# Patient Record
Sex: FEMALE | Race: WHITE | Employment: FULL TIME | URBAN - METROPOLITAN AREA
[De-identification: names, ages, dates, MRNs, and addresses within clinical notes are randomized per-mention and may not be internally consistent; named-entity substitution may affect disease eponyms.]

---

## 2021-02-12 ENCOUNTER — HOSPITAL ENCOUNTER (EMERGENCY)
Facility: CLINIC | Age: 41
Discharge: HOME | End: 2021-02-12
Attending: EMERGENCY MEDICINE | Admitting: EMERGENCY MEDICINE
Payer: COMMERCIAL

## 2021-02-12 VITALS
DIASTOLIC BLOOD PRESSURE: 74 MMHG | RESPIRATION RATE: 20 BRPM | OXYGEN SATURATION: 94 % | TEMPERATURE: 98.2 F | HEIGHT: 66 IN | SYSTOLIC BLOOD PRESSURE: 124 MMHG | HEART RATE: 92 BPM

## 2021-02-12 DIAGNOSIS — T78.2XXA ANAPHYLAXIS, INITIAL ENCOUNTER: ICD-10-CM

## 2021-02-12 PROCEDURE — 96375 TX/PRO/DX INJ NEW DRUG ADDON: CPT

## 2021-02-12 PROCEDURE — 250N000011 HC RX IP 250 OP 636: Performed by: EMERGENCY MEDICINE

## 2021-02-12 PROCEDURE — 250N000009 HC RX 250: Performed by: EMERGENCY MEDICINE

## 2021-02-12 PROCEDURE — 96374 THER/PROPH/DIAG INJ IV PUSH: CPT

## 2021-02-12 PROCEDURE — 99285 EMERGENCY DEPT VISIT HI MDM: CPT | Mod: 25

## 2021-02-12 RX ORDER — METHYLPREDNISOLONE SODIUM SUCCINATE 125 MG/2ML
125 INJECTION, POWDER, LYOPHILIZED, FOR SOLUTION INTRAMUSCULAR; INTRAVENOUS ONCE
Status: COMPLETED | OUTPATIENT
Start: 2021-02-12 | End: 2021-02-12

## 2021-02-12 RX ORDER — DIPHENHYDRAMINE HYDROCHLORIDE 50 MG/ML
50 INJECTION INTRAMUSCULAR; INTRAVENOUS ONCE
Status: COMPLETED | OUTPATIENT
Start: 2021-02-12 | End: 2021-02-12

## 2021-02-12 RX ORDER — LORAZEPAM 2 MG/ML
1 INJECTION INTRAMUSCULAR ONCE
Status: COMPLETED | OUTPATIENT
Start: 2021-02-12 | End: 2021-02-12

## 2021-02-12 RX ORDER — PREDNISONE 20 MG/1
40 TABLET ORAL DAILY
Qty: 10 TABLET | Refills: 0 | Status: SHIPPED | OUTPATIENT
Start: 2021-02-12 | End: 2021-02-17

## 2021-02-12 RX ORDER — KETOROLAC TROMETHAMINE 15 MG/ML
15 INJECTION, SOLUTION INTRAMUSCULAR; INTRAVENOUS ONCE
Status: COMPLETED | OUTPATIENT
Start: 2021-02-12 | End: 2021-02-12

## 2021-02-12 RX ORDER — EPINEPHRINE 0.3 MG/.3ML
0.3 INJECTION SUBCUTANEOUS
Qty: 2 EACH | Refills: 1 | Status: SHIPPED | OUTPATIENT
Start: 2021-02-12

## 2021-02-12 RX ORDER — DIPHENHYDRAMINE HCL 25 MG
TABLET ORAL
Qty: 30 TABLET | Refills: 1 | Status: SHIPPED | OUTPATIENT
Start: 2021-02-12

## 2021-02-12 RX ADMIN — KETOROLAC TROMETHAMINE 15 MG: 15 INJECTION, SOLUTION INTRAMUSCULAR; INTRAVENOUS at 12:36

## 2021-02-12 RX ADMIN — FAMOTIDINE 20 MG: 10 INJECTION, SOLUTION INTRAVENOUS at 12:39

## 2021-02-12 RX ADMIN — DIPHENHYDRAMINE HYDROCHLORIDE 50 MG: 50 INJECTION, SOLUTION INTRAMUSCULAR; INTRAVENOUS at 12:35

## 2021-02-12 RX ADMIN — METHYLPREDNISOLONE SODIUM SUCCINATE 125 MG: 125 INJECTION, POWDER, FOR SOLUTION INTRAMUSCULAR; INTRAVENOUS at 12:36

## 2021-02-12 RX ADMIN — LORAZEPAM 1 MG: 2 INJECTION INTRAMUSCULAR; INTRAVENOUS at 12:36

## 2021-02-12 ASSESSMENT — ENCOUNTER SYMPTOMS
TREMORS: 1
SHORTNESS OF BREATH: 1
FACIAL SWELLING: 0
NERVOUS/ANXIOUS: 1
ABDOMINAL PAIN: 0
HEADACHES: 1
NAUSEA: 0

## 2021-02-12 NOTE — ED PROVIDER NOTES
"  History   Chief Complaint:  Allergic Reaction     HPI   Geraldine Wayne is an otherwise healthy 40 year old female who presents for evaluation after an allergic reaction. The patient states that she received cortisone injections in her feet this morning and developed hives on her legs, anxiety sensation, and shortness of breath shortly thereafter. She reports presenting to urgent care immediately and receiving benadryl IM and epinephrine IM at approximately 1130, shortly before being referred to the emergency department. She denies any facial swelling, abdominal pain, nausea, or any other symptoms. Her sense of SOB has resolved. Her hives are still present but have markedly improved.  She has developed a HA and fine tremors. Of note, she states that she has received cortisone injections once in the past but that a new spray freezing agent was used this time.    Review of Systems   HENT: Negative for facial swelling.    Respiratory: Positive for shortness of breath.    Gastrointestinal: Negative for abdominal pain and nausea.   Skin: Positive for rash.   Neurological: Positive for tremors and headaches.   Psychiatric/Behavioral: The patient is nervous/anxious.    All other systems reviewed and are negative.      Allergies:  The patient has no known allergies.     Medications:  Benadryl  Epinephrine    Past Medical History:    The patient denies any past medical history including hypertension, hyperlipidemia, or diabetes mellitus type II.     Social History:  The patient presents with her  and denies any drug use.    Physical Exam     Patient Vitals for the past 24 hrs:   BP Temp Temp src Pulse Resp SpO2 Height   02/12/21 1400 124/74 -- -- -- -- 94 % --   02/12/21 1214 (!) 111/99 98.2  F (36.8  C) Temporal 92 20 99 % 1.676 m (5' 6\")       Physical Exam  General/Appearance: appears stated age, well-groomed, appears mildly anxious and tremulous, breathless appearing  Eyes: EOMI, no scleral injection, no " icterus  ENT: MMM, no lip/tongue/uvula edema  Neck: supple, nl ROM, no stiffness  Cardiovascular: RRR, nl S1S2, no m/r/g, 2+ pulses in all 4 extremities, cap refill <2sec  Respiratory: CTAB, good air movement throughout, no wheezes/rhonchi/rales, no increased WOB, no retractions  GI: abd soft, non-distended, nttp,  no HSM, no rebound, no guarding, nl BS  MSK: CAGLE, good tone, no bony abnormality  Skin: warm and well-perfused, diffuse hives especially to BLE, no edema, no ecchymosis, nl turgor  Neuro: GCS 15, alert and oriented, no gross focal neuro deficits  Psych: interacts appropriately  Heme: no petechia, no purpura, no active bleeding      Emergency Department Course     Emergency Department Course:    Reviewed:  I reviewed nursing notes, vitals and past medical history.    Assessments:  1219 I obtained history and examined the patient as noted above.     1345 I rechecked the patient. She stated that she was feeling better overall.    Interventions:  1235 Benadryl 50 mg IV  1236 Solu-medrol 125 mg IV  1236 Toradol 15 mg IV  1236 Ativan 1 mg IV  1239 Pepcid 20 mg IV    Disposition:  The patient was discharged to home.       Impression & Plan     Medical Decision Making:  This pt presented with signs/sxs consistent with severe allergic reaction/anaphylaxis.  Because of this the severity of their sxs they were given Benadryl, an H1 Blocker, Steroids, and Epinephrine.  This resulted in significant improvement in the pt's sxs.  They were placed on monitors and observed for 2.5 hours without return of their sxs.  The pt remains HD stable and w/o respiratory involvement.  Because of this I feel comfortable with discharge.  That being said, the pt has been given very strict return precautions including emergent return for any lip/tongue/throat involvement, SOB, CP, lightheadedness/syncope, or other new and concerning sxs.  They are being discharged on prn Benadryl, steroid burst, and with an Rx for epi pens. They have  recommended follow-up with PMD in 1-2 days.  Pt expresses understanding and agreement with the plan.      Covid-19  Geraldine Wayne was evaluated during a global COVID-19 pandemic, which necessitated consideration that the patient might be at risk for infection with the SARS-CoV-2 virus that causes COVID-19.   Applicable protocols for evaluation were followed during the patient's care.     Diagnosis:    ICD-10-CM    1. Anaphylaxis, initial encounter  T78.2XXA        Discharge Medications:  Discharge Medication List as of 2/12/2021  2:00 PM      START taking these medications    Details   diphenhydrAMINE (BENADRYL) 25 MG tablet Take 2 tablets every 6hrs prn itching, hives, shortness of breath, other allergy symptoms, Disp-30 tablet, R-1, E-Prescribe      EPINEPHrine (ANY BX GENERIC EQUIV) 0.3 MG/0.3ML injection 2-pack Inject 0.3 mLs (0.3 mg) into the muscle once as needed for anaphylaxis, Disp-2 each, R-1, E-Prescribe      predniSONE (DELTASONE) 20 MG tablet Take 2 tablets (40 mg) by mouth daily for 5 days, Disp-10 tablet, R-0, E-Prescribe             Scribe Disclosure:  I, Lupillo Harrison, am serving as a scribe at 12:19 PM on 2/12/2021 to document services personally performed by Mar Hart MD based on my observations and the provider's statements to me.              Mar Hart MD  02/12/21 5480

## 2021-02-12 NOTE — ED TRIAGE NOTES
Pt reports she developed allergic reaction after receiving cortisone injection into foot. Given Epipen prior to arrival.

## 2022-02-24 ENCOUNTER — HOSPITAL ENCOUNTER (EMERGENCY)
Facility: CLINIC | Age: 42
Discharge: HOME | End: 2022-02-25
Attending: EMERGENCY MEDICINE | Admitting: EMERGENCY MEDICINE
Payer: COMMERCIAL

## 2022-02-24 VITALS
TEMPERATURE: 101.1 F | HEART RATE: 105 BPM | SYSTOLIC BLOOD PRESSURE: 149 MMHG | BODY MASS INDEX: 27.8 KG/M2 | OXYGEN SATURATION: 100 % | HEIGHT: 66 IN | WEIGHT: 173 LBS | RESPIRATION RATE: 18 BRPM | DIASTOLIC BLOOD PRESSURE: 79 MMHG

## 2022-02-24 DIAGNOSIS — R19.7 DIARRHEA OF PRESUMED INFECTIOUS ORIGIN: ICD-10-CM

## 2022-02-24 DIAGNOSIS — R51.9 ACUTE NONINTRACTABLE HEADACHE, UNSPECIFIED HEADACHE TYPE: ICD-10-CM

## 2022-02-24 PROBLEM — G43.909 MIGRAINE WITHOUT STATUS MIGRAINOSUS, NOT INTRACTABLE, UNSPECIFIED MIGRAINE TYPE: Status: ACTIVE | Noted: 2021-12-27

## 2022-02-24 LAB
ALBUMIN SERPL-MCNC: 3.4 G/DL (ref 3.4–5)
ALBUMIN UR-MCNC: NEGATIVE MG/DL
ALP SERPL-CCNC: 73 U/L (ref 40–150)
ALT SERPL W P-5'-P-CCNC: 22 U/L (ref 0–50)
ANION GAP SERPL CALCULATED.3IONS-SCNC: 6 MMOL/L (ref 3–14)
APPEARANCE UR: CLEAR
AST SERPL W P-5'-P-CCNC: 13 U/L (ref 0–45)
BASOPHILS # BLD AUTO: 0 10E3/UL (ref 0–0.2)
BASOPHILS NFR BLD AUTO: 0 %
BILIRUB DIRECT SERPL-MCNC: <0.1 MG/DL (ref 0–0.2)
BILIRUB SERPL-MCNC: 0.4 MG/DL (ref 0.2–1.3)
BILIRUB UR QL STRIP: NEGATIVE
BUN SERPL-MCNC: 11 MG/DL (ref 7–30)
CALCIUM SERPL-MCNC: 8.3 MG/DL (ref 8.5–10.1)
CHLORIDE BLD-SCNC: 107 MMOL/L (ref 94–109)
CO2 SERPL-SCNC: 22 MMOL/L (ref 20–32)
COLOR UR AUTO: ABNORMAL
CREAT SERPL-MCNC: 0.85 MG/DL (ref 0.52–1.04)
EOSINOPHIL # BLD AUTO: 0.1 10E3/UL (ref 0–0.7)
EOSINOPHIL NFR BLD AUTO: 2 %
ERYTHROCYTE [DISTWIDTH] IN BLOOD BY AUTOMATED COUNT: 13.4 % (ref 10–15)
GFR SERPL CREATININE-BSD FRML MDRD: 87 ML/MIN/1.73M2
GLUCOSE BLD-MCNC: 119 MG/DL (ref 70–99)
GLUCOSE UR STRIP-MCNC: NEGATIVE MG/DL
HCG SERPL QL: NEGATIVE
HCG UR QL: NEGATIVE
HCT VFR BLD AUTO: 39.3 % (ref 35–47)
HGB BLD-MCNC: 12.7 G/DL (ref 11.7–15.7)
HGB UR QL STRIP: ABNORMAL
IMM GRANULOCYTES # BLD: 0 10E3/UL
IMM GRANULOCYTES NFR BLD: 0 %
KETONES UR STRIP-MCNC: NEGATIVE MG/DL
LACTATE SERPL-SCNC: 2 MMOL/L (ref 0.7–2)
LEUKOCYTE ESTERASE UR QL STRIP: NEGATIVE
LYMPHOCYTES # BLD AUTO: 0.5 10E3/UL (ref 0.8–5.3)
LYMPHOCYTES NFR BLD AUTO: 7 %
MCH RBC QN AUTO: 28 PG (ref 26.5–33)
MCHC RBC AUTO-ENTMCNC: 32.3 G/DL (ref 31.5–36.5)
MCV RBC AUTO: 87 FL (ref 78–100)
MONOCYTES # BLD AUTO: 0.5 10E3/UL (ref 0–1.3)
MONOCYTES NFR BLD AUTO: 6 %
MUCOUS THREADS #/AREA URNS LPF: PRESENT /LPF
NEUTROPHILS # BLD AUTO: 6.8 10E3/UL (ref 1.6–8.3)
NEUTROPHILS NFR BLD AUTO: 85 %
NITRATE UR QL: NEGATIVE
NRBC # BLD AUTO: 0 10E3/UL
NRBC BLD AUTO-RTO: 0 /100
PH UR STRIP: 5.5 [PH] (ref 5–7)
PLATELET # BLD AUTO: 248 10E3/UL (ref 150–450)
POTASSIUM BLD-SCNC: 3.6 MMOL/L (ref 3.4–5.3)
PROT SERPL-MCNC: 7.2 G/DL (ref 6.8–8.8)
RBC # BLD AUTO: 4.54 10E6/UL (ref 3.8–5.2)
RBC URINE: 0 /HPF
SODIUM SERPL-SCNC: 135 MMOL/L (ref 133–144)
SP GR UR STRIP: 1.01 (ref 1–1.03)
SQUAMOUS EPITHELIAL: <1 /HPF
UROBILINOGEN UR STRIP-MCNC: NORMAL MG/DL
WBC # BLD AUTO: 8 10E3/UL (ref 4–11)
WBC URINE: 0 /HPF

## 2022-02-24 PROCEDURE — 84703 CHORIONIC GONADOTROPIN ASSAY: CPT | Performed by: EMERGENCY MEDICINE

## 2022-02-24 PROCEDURE — 82310 ASSAY OF CALCIUM: CPT | Performed by: EMERGENCY MEDICINE

## 2022-02-24 PROCEDURE — 81025 URINE PREGNANCY TEST: CPT | Performed by: EMERGENCY MEDICINE

## 2022-02-24 PROCEDURE — 87040 BLOOD CULTURE FOR BACTERIA: CPT | Performed by: EMERGENCY MEDICINE

## 2022-02-24 PROCEDURE — 82248 BILIRUBIN DIRECT: CPT | Performed by: EMERGENCY MEDICINE

## 2022-02-24 PROCEDURE — 36415 COLL VENOUS BLD VENIPUNCTURE: CPT | Performed by: EMERGENCY MEDICINE

## 2022-02-24 PROCEDURE — 96374 THER/PROPH/DIAG INJ IV PUSH: CPT

## 2022-02-24 PROCEDURE — 250N000013 HC RX MED GY IP 250 OP 250 PS 637: Performed by: EMERGENCY MEDICINE

## 2022-02-24 PROCEDURE — C9803 HOPD COVID-19 SPEC COLLECT: HCPCS

## 2022-02-24 PROCEDURE — 85025 COMPLETE CBC W/AUTO DIFF WBC: CPT | Performed by: EMERGENCY MEDICINE

## 2022-02-24 PROCEDURE — 99284 EMERGENCY DEPT VISIT MOD MDM: CPT | Mod: 25

## 2022-02-24 PROCEDURE — 96361 HYDRATE IV INFUSION ADD-ON: CPT

## 2022-02-24 PROCEDURE — 258N000003 HC RX IP 258 OP 636: Performed by: EMERGENCY MEDICINE

## 2022-02-24 PROCEDURE — 250N000011 HC RX IP 250 OP 636: Performed by: EMERGENCY MEDICINE

## 2022-02-24 PROCEDURE — 81001 URINALYSIS AUTO W/SCOPE: CPT | Performed by: EMERGENCY MEDICINE

## 2022-02-24 PROCEDURE — 83605 ASSAY OF LACTIC ACID: CPT | Performed by: EMERGENCY MEDICINE

## 2022-02-24 PROCEDURE — 250N000009 HC RX 250: Performed by: EMERGENCY MEDICINE

## 2022-02-24 RX ORDER — METOCLOPRAMIDE HYDROCHLORIDE 5 MG/ML
10 INJECTION INTRAMUSCULAR; INTRAVENOUS ONCE
Status: COMPLETED | OUTPATIENT
Start: 2022-02-24 | End: 2022-02-24

## 2022-02-24 RX ORDER — ACETAMINOPHEN 500 MG
1000 TABLET ORAL ONCE
Status: COMPLETED | OUTPATIENT
Start: 2022-02-24 | End: 2022-02-24

## 2022-02-24 RX ORDER — SODIUM CHLORIDE 9 MG/ML
INJECTION, SOLUTION INTRAVENOUS CONTINUOUS
Status: DISCONTINUED | OUTPATIENT
Start: 2022-02-24 | End: 2022-02-25 | Stop reason: HOSPADM

## 2022-02-24 RX ADMIN — ACETAMINOPHEN 1000 MG: 500 TABLET, FILM COATED ORAL at 22:20

## 2022-02-24 RX ADMIN — SODIUM CHLORIDE 1000 ML: 9 INJECTION, SOLUTION INTRAVENOUS at 22:22

## 2022-02-24 RX ADMIN — LIDOCAINE HYDROCHLORIDE 30 ML: 20 SOLUTION ORAL; TOPICAL at 22:20

## 2022-02-24 RX ADMIN — SODIUM CHLORIDE 1000 ML: 9 INJECTION, SOLUTION INTRAVENOUS at 21:44

## 2022-02-24 RX ADMIN — METOCLOPRAMIDE 10 MG: 5 INJECTION, SOLUTION INTRAMUSCULAR; INTRAVENOUS at 22:20

## 2022-02-25 ENCOUNTER — TELEPHONE (OUTPATIENT)
Dept: EMERGENCY MEDICINE | Facility: CLINIC | Age: 42
End: 2022-02-25
Payer: COMMERCIAL

## 2022-02-25 LAB
C COLI+JEJUNI+LARI FUSA STL QL NAA+PROBE: NOT DETECTED
C DIFF TOX B STL QL: NEGATIVE
EC STX1 GENE STL QL NAA+PROBE: NOT DETECTED
EC STX2 GENE STL QL NAA+PROBE: NOT DETECTED
FLUAV RNA SPEC QL NAA+PROBE: NEGATIVE
FLUBV RNA RESP QL NAA+PROBE: NEGATIVE
NOROV GI+II ORF1-ORF2 JNC STL QL NAA+PR: DETECTED
RVA NSP5 STL QL NAA+PROBE: NOT DETECTED
SALMONELLA SP RPOD STL QL NAA+PROBE: NOT DETECTED
SARS-COV-2 RNA RESP QL NAA+PROBE: NEGATIVE
SHIGELLA SP+EIEC IPAH STL QL NAA+PROBE: NOT DETECTED
V CHOL+PARA RFBL+TRKH+TNAA STL QL NAA+PR: NOT DETECTED
Y ENTERO RECN STL QL NAA+PROBE: NOT DETECTED

## 2022-02-25 PROCEDURE — 87493 C DIFF AMPLIFIED PROBE: CPT | Performed by: EMERGENCY MEDICINE

## 2022-02-25 PROCEDURE — 87506 IADNA-DNA/RNA PROBE TQ 6-11: CPT | Performed by: EMERGENCY MEDICINE

## 2022-02-25 PROCEDURE — 87636 SARSCOV2 & INF A&B AMP PRB: CPT | Performed by: EMERGENCY MEDICINE

## 2022-02-25 NOTE — ED NOTES
Stool sample to lab. Pt resting calmly w/o complaint. Pt dcd with instructions. All questions answered.

## 2022-02-25 NOTE — TELEPHONE ENCOUNTER
[facility] () Emergency Department/Urgent Care Lab result notification:    [place] ED lab result protocol used  Enteric Bacteria & Virus Panel    Reason for call  Notify of lab results, assess symptoms,  review ED providers recommendations/discharge instructions (if necessary) and advise per ED lab result f/u protocol    Lab Result   Final Enteric Bacteria and Virus Panel by ROXANA Stool is POSITIVE for NOROVIRUS I & II  Recommendations in treatment per [facility] ED Lab Result Enteric Bacteria and Virus Panel protocol.    Information table from Emergency Dept Provider visit on 2/24/22  Symptoms reported at ED visit (Chief complaint, HPI) Chief Complaint:     Diarrhea and Headache        HPI   Geraldine Wayne is a 41 year old female who presents with 8 days of diarrhea.  She notes she had 10 bouts of it today.  Not had any vomiting.  Unsure if this is etiology but she notes that at work on the day prior to the onset of symptoms she had an awful tasting salad.  She not had any trips or travels or suspicious johns other than this suspicious salad.  She has no recent antibiotics or exposure or history of C. difficile.  She has a family history of irritable bowel but no personal history of IBS and no history of ulcerative colitis or Crohn's in the family.  She does have a history of migraines.  She had onset of feelings of a migraine earlier today and has a headache now.  She has no respiratory symptoms.  She is vaccinated for Covid.  She took a home Covid test at home and was negative.   ED providers Impression and Plan (applicable information) Patient presents with diarrhea for the past 8 days fever starting today as well as headache.  Diarrhea sounds to be infectious as it started after a suspicious salad.  C. difficile and stool cultures are obtained.  Patient has a benign abdominal exam.  Labs are reassuring.  She was given hydration.  Stool cultures are obtained.  Covid is also obtained  although she has had a negative Covid test at home.  Does have a history of migraines and was treated with migraine treatment with improvement of symptoms.  Discussed outpatient management with her and supportive care.  Will await stool culture results.   Miscellaneous information NONE     RN Assessment (Patient s current Symptoms), include time called.   2:24P - Left voicemail message requesting a call back to [facility] ED Lab Result RN at [phone].  RN is available every day between 9 a.m. and 5:30 p.m.        Kat Sahu RN  [facility]  Emergency Dept Lab Result RN  Ph# [phone]     Copy of Lab result   Component      Latest Ref Rng & Units 2/25/2022   Campylobacter group by ROXANA      Not Detected Not Detected   Salmonella species by ROXANA      Not Detected Not Detected   Shigella species by ROXANA      Not Detected Not Detected   Vibrio group by ROXANA      Not Detected Not Detected   Rotavirus A by ROXANA      Not Detected Not Detected   Shiga toxin 1 gene by ROXANA      Not Detected Not Detected   Shiga toxin 2 gene by ROXANA      Not Detected Not Detected   Norovirus I and II by ROXANA      Not Detected Detected (A)   Yersinia enterocolitica by ROXANA      Not Detected Not Detected

## 2022-02-25 NOTE — ED TRIAGE NOTES
Pt walks in to ed with c/o diarrhea and headache going on day eight. Pt saw primary md yesterday with improving sx but worse today. Pt also c/o reflux issues.

## 2022-02-25 NOTE — ED PROVIDER NOTES
"  History     Chief Complaint:    Diarrhea and Headache      HPI   Geraldine Wayne is a 41 year old female who presents with 8 days of diarrhea.  She notes she had 10 bouts of it today.  Not had any vomiting.  Unsure if this is etiology but she notes that at work on the day prior to the onset of symptoms she had an awful tasting salad.  She not had any trips or travels or suspicious johns other than this suspicious salad.  She has no recent antibiotics or exposure or history of C. difficile.  She has a family history of irritable bowel but no personal history of IBS and no history of ulcerative colitis or Crohn's in the family.  She does have a history of migraines.  She had onset of feelings of a migraine earlier today and has a headache now.  She has no respiratory symptoms.  She is vaccinated for Covid.  She took a home Covid test at home and was negative.    Review of Systems  Fevers chills, very mild and sporadic abdominal pain, diarrhea, no vomiting, headache, no cough respiratory symptoms all other systems negative    Allergies:      Other Drug Allergy (See Comments)      Medications:      diphenhydrAMINE (BENADRYL) 25 MG tablet  EPINEPHrine (ANY BX GENERIC EQUIV) 0.3 MG/0.3ML injection 2-pack        Past Medical History:        No past medical history on file.  Patient Active Problem List    Diagnosis Date Noted     Migraine without status migrainosus, not intractable, unspecified migraine type 12/27/2021     Priority: Medium        Past Surgical History:        No past surgical history on file.    Family History:        No family history on file.    Social History:  Lives at home with  and children    Physical Exam     Patient Vitals for the past 24 hrs:   BP Temp Temp src Pulse Resp SpO2 Height Weight   02/24/22 2044 (!) 149/79 (!) 101.1  F (38.4  C) Oral 105 18 100 % 1.676 m (5' 6\") 78.5 kg (173 lb)       Physical Exam  GENERAL: well developed, pleasant  HEAD: atraumatic  EYES: pupils reactive, " extraocular muscles intact, conjunctivae normal  ENT:  mucus membranes moist  NECK:  trachea midline, normal range of motion  RESPIRATORY: no tachypnea, breath sounds clear to auscultation   CVS: normal S1/S2, no murmurs, intact distal pulses  ABDOMEN: soft, mild generalized tenderness, no focal tenderness  MUSCULOSKELETAL: no deformities  SKIN: warm and dry, no acute rashes or ulceration  NEURO: GCS 15, cranial nerves intact, alert and oriented x3  PSYCH:  Mood/affect normal        Emergency Department Course        Imaging:  No orders to display       Laboratory:  Labs Ordered and Resulted from Time of ED Arrival to Time of ED Departure   BASIC METABOLIC PANEL - Abnormal       Result Value    Sodium 135      Potassium 3.6      Chloride 107      Carbon Dioxide (CO2) 22      Anion Gap 6      Urea Nitrogen 11      Creatinine 0.85      Calcium 8.3 (*)     Glucose 119 (*)     GFR Estimate 87     ROUTINE UA WITH MICROSCOPIC REFLEX TO CULTURE - Abnormal    Color Urine Straw      Appearance Urine Clear      Glucose Urine Negative      Bilirubin Urine Negative      Ketones Urine Negative      Specific Gravity Urine 1.007      Blood Urine Small (*)     pH Urine 5.5      Protein Albumin Urine Negative      Urobilinogen Urine Normal      Nitrite Urine Negative      Leukocyte Esterase Urine Negative      Mucus Urine Present (*)     RBC Urine 0      WBC Urine 0      Squamous Epithelials Urine <1     CBC WITH PLATELETS AND DIFFERENTIAL - Abnormal    WBC Count 8.0      RBC Count 4.54      Hemoglobin 12.7      Hematocrit 39.3      MCV 87      MCH 28.0      MCHC 32.3      RDW 13.4      Platelet Count 248      % Neutrophils 85      % Lymphocytes 7      % Monocytes 6      % Eosinophils 2      % Basophils 0      % Immature Granulocytes 0      NRBCs per 100 WBC 0      Absolute Neutrophils 6.8      Absolute Lymphocytes 0.5 (*)     Absolute Monocytes 0.5      Absolute Eosinophils 0.1      Absolute Basophils 0.0      Absolute Immature  Granulocytes 0.0      Absolute NRBCs 0.0     LACTIC ACID WHOLE BLOOD - Normal    Lactic Acid 2.0     HCG QUALITATIVE URINE - Normal    hCG Urine Qualitative Negative     HCG QUALITATIVE PREGNANCY - Normal    hCG Serum Qualitative Negative     HEPATIC FUNCTION PANEL - Normal    Bilirubin Total 0.4      Bilirubin Direct <0.1      Protein Total 7.2      Albumin 3.4      Alkaline Phosphatase 73      AST 13      ALT 22     BLOOD CULTURE   BLOOD CULTURE   ENTERIC BACTERIA AND VIRUS PANEL BY ROXANA STOOL   CLOSTRIDIUM DIFFICILE TOXIN B       Procedures:      Emergency Department Course:           Reviewed:    I reviewed nursing notes, vitals and past history    Assessments:   I obtained history and examined the patient as noted above.    I rechecked the patient and explained findings.       Consults:            Interventions:    Medications   0.9% sodium chloride BOLUS (0 mLs Intravenous Stopped 2/25/22 0019)     Followed by   sodium chloride 0.9% infusion (has no administration in time range)   0.9% sodium chloride BOLUS (0 mLs Intravenous Stopped 2/25/22 0019)     Followed by   sodium chloride 0.9% infusion (has no administration in time range)   lidocaine (viscous) (XYLOCAINE) 2 % 15 mL, alum & mag hydroxide-simethicone (MAALOX) 15 mL GI Cocktail (30 mLs Oral Given 2/24/22 2220)   metoclopramide (REGLAN) injection 10 mg (10 mg Intravenous Given 2/24/22 2220)   acetaminophen (TYLENOL) tablet 1,000 mg (1,000 mg Oral Given 2/24/22 2220)       Disposition:  The patient was discharged to home.    Impression & Plan                   Medical Decision Making:  Patient presents with diarrhea for the past 8 days fever starting today as well as headache.  Diarrhea sounds to be infectious as it started after a suspicious salad.  C. difficile and stool cultures are obtained.  Patient has a benign abdominal exam.  Labs are reassuring.  She was given hydration.  Stool cultures are obtained.  Covid is also obtained although she has had a  negative Covid test at home.  Does have a history of migraines and was treated with migraine treatment with improvement of symptoms.  Discussed outpatient management with her and supportive care.  Will await stool culture results.    Critical Care time:  none    Covid-19  Geraldine Wayne was evaluated during a global COVID-19 pandemic, which necessitated consideration that the patient might be at risk for infection with the SARS-CoV-2 virus that causes COVID-19.   Applicable protocols for evaluation were followed during the patient's care.   COVID-19 was considered as part of the patient's evaluation. The plan for testing is:  a test was obtained during this visit.      Diagnosis:    ICD-10-CM    1. Diarrhea of presumed infectious origin  R19.7    2. Acute nonintractable headache, unspecified headache type  R51.9        Discharge Medications:  Discharge Medication List as of 2/25/2022 12:25 AM            Scribe Disclosure:  Sacha ALVAREZ MD, am serving as a scribe at 10:08 PM on 2/24/2022 to document services personally performed by Sacha Domingo MD based on my observations and the provider's statements to me.      Sacha Domingo MD  02/25/22 0226

## 2022-02-27 NOTE — TELEPHONE ENCOUNTER
[facility] result notification     Patient/parent Name  Geraldine    RN Assessment (Patient s current Symptoms), include time called.  [Insert Left message here if message left]  5:05 Relayed results to patient, she states that she feels good today. Only one episode of diarrhea  She states her son had the same symptoms and so did other kids in his school program.  She will continue to monitor her symptoms and know to be seen in not steadily improving.  Lab result (if applicable):  Final Enteric Bacteria and Virus Panel by ROXANA Stool is POSITIVE for NOROVIRUS I & II  Recommendations in treatment per [facility] ED Lab Result Enteric Bacteria and Virus Panel protocol.  RN Recommendations/Instructions per [place] ED lab result protocol  Patient notified of lab result and treatment recommendations RN reviewed information about Referenced the following information for Norovirus I and II from this source RN protocols reference guide     Please Contact your PCP clinic or return to the Emergency department if your:    Symptoms return.    Symptoms worsen or other concerning symptom's.        Isabella Lara RN  [facility] [place]  Emergency Dept Lab Result RN  Ph# [phone]

## 2022-03-02 LAB
BACTERIA BLD CULT: NO GROWTH
BACTERIA BLD CULT: NO GROWTH

## 2022-04-16 ENCOUNTER — HEALTH MAINTENANCE LETTER (OUTPATIENT)
Age: 42
End: 2022-04-16

## 2022-10-16 ENCOUNTER — HEALTH MAINTENANCE LETTER (OUTPATIENT)
Age: 42
End: 2022-10-16

## 2023-06-01 ENCOUNTER — HEALTH MAINTENANCE LETTER (OUTPATIENT)
Age: 43
End: 2023-06-01

## 2024-06-01 ENCOUNTER — HEALTH MAINTENANCE LETTER (OUTPATIENT)
Age: 44
End: 2024-06-01

## 2024-08-10 ENCOUNTER — HEALTH MAINTENANCE LETTER (OUTPATIENT)
Age: 44
End: 2024-08-10

## 2025-07-08 PROCEDURE — 88305 TISSUE EXAM BY PATHOLOGIST: CPT | Mod: TC,ORL | Performed by: SPECIALIST

## 2025-07-09 ENCOUNTER — LAB REQUISITION (OUTPATIENT)
Dept: LAB | Facility: CLINIC | Age: 45
End: 2025-07-09
Payer: COMMERCIAL

## 2025-07-24 LAB
PATH REPORT.COMMENTS IMP SPEC: NORMAL
PATH REPORT.COMMENTS IMP SPEC: NORMAL
PATH REPORT.FINAL DX SPEC: NORMAL
PATH REPORT.GROSS SPEC: NORMAL
PATH REPORT.MICROSCOPIC SPEC OTHER STN: NORMAL
PATH REPORT.RELEVANT HX SPEC: NORMAL
PHOTO IMAGE: NORMAL

## 2025-07-24 PROCEDURE — 88305 TISSUE EXAM BY PATHOLOGIST: CPT | Mod: 26 | Performed by: PATHOLOGY
